# Patient Record
Sex: FEMALE | Race: WHITE | Employment: FULL TIME | URBAN - METROPOLITAN AREA
[De-identification: names, ages, dates, MRNs, and addresses within clinical notes are randomized per-mention and may not be internally consistent; named-entity substitution may affect disease eponyms.]

---

## 2016-08-11 LAB
ANTIBODY SCREEN, EXTERNAL: NORMAL
CHLAMYDIA, EXTERNAL: NORMAL
CYSTIC FIBROSIS, EXTERNAL: NORMAL
HBSAG, EXTERNAL: NORMAL
HEPATITIS C AB,   EXT: NORMAL
HIV, EXTERNAL: NORMAL
N. GONORRHEA, EXTERNAL: NORMAL
RUBELLA, EXTERNAL: NORMAL
TYPE, ABO & RH, EXTERNAL: NORMAL

## 2017-02-20 LAB — GRBS, EXTERNAL: NORMAL

## 2017-03-12 ENCOUNTER — HOSPITAL ENCOUNTER (INPATIENT)
Age: 37
LOS: 3 days | Discharge: HOME | End: 2017-03-15
Attending: OBSTETRICS & GYNECOLOGY | Admitting: OBSTETRICS & GYNECOLOGY
Payer: OTHER GOVERNMENT

## 2017-03-12 PROBLEM — B00.9 HERPES: Status: ACTIVE | Noted: 2017-03-12

## 2017-03-12 PROBLEM — O36.5990 SMALL FOR GESTATIONAL AGE FETUS AFFECTING MOTHER, ANTEPARTUM: Status: ACTIVE | Noted: 2017-03-12

## 2017-03-12 LAB
ABO + RH BLD: NORMAL
BASOPHILS # BLD AUTO: 0 K/UL (ref 0–0.06)
BASOPHILS # BLD: 0 % (ref 0–2)
BLOOD GROUP ANTIBODIES SERPL: NORMAL
DIFFERENTIAL METHOD BLD: ABNORMAL
EOSINOPHIL # BLD: 0 K/UL (ref 0–0.4)
EOSINOPHIL NFR BLD: 0 % (ref 0–5)
ERYTHROCYTE [DISTWIDTH] IN BLOOD BY AUTOMATED COUNT: 13.5 % (ref 11.6–14.5)
HCT VFR BLD AUTO: 38.4 % (ref 35–45)
HGB BLD-MCNC: 12.8 G/DL (ref 12–16)
LYMPHOCYTES # BLD AUTO: 20 % (ref 21–52)
LYMPHOCYTES # BLD: 2.2 K/UL (ref 0.9–3.6)
MCH RBC QN AUTO: 31.1 PG (ref 24–34)
MCHC RBC AUTO-ENTMCNC: 33.3 G/DL (ref 31–37)
MCV RBC AUTO: 93.2 FL (ref 74–97)
MONOCYTES # BLD: 1 K/UL (ref 0.05–1.2)
MONOCYTES NFR BLD AUTO: 9 % (ref 3–10)
NEUTS SEG # BLD: 7.6 K/UL (ref 1.8–8)
NEUTS SEG NFR BLD AUTO: 71 % (ref 40–73)
PLATELET # BLD AUTO: 210 K/UL (ref 135–420)
PMV BLD AUTO: 11 FL (ref 9.2–11.8)
RBC # BLD AUTO: 4.12 M/UL (ref 4.2–5.3)
SPECIMEN EXP DATE BLD: NORMAL
WBC # BLD AUTO: 10.8 K/UL (ref 4.6–13.2)

## 2017-03-12 PROCEDURE — 74011250637 HC RX REV CODE- 250/637: Performed by: ADVANCED PRACTICE MIDWIFE

## 2017-03-12 PROCEDURE — 65270000029 HC RM PRIVATE

## 2017-03-12 PROCEDURE — 74011250637 HC RX REV CODE- 250/637: Performed by: OBSTETRICS & GYNECOLOGY

## 2017-03-12 PROCEDURE — 86900 BLOOD TYPING SEROLOGIC ABO: CPT | Performed by: ADVANCED PRACTICE MIDWIFE

## 2017-03-12 PROCEDURE — 77030020255 HC SOL INJ LR 1000ML BG

## 2017-03-12 PROCEDURE — 85025 COMPLETE CBC W/AUTO DIFF WBC: CPT | Performed by: ADVANCED PRACTICE MIDWIFE

## 2017-03-12 RX ORDER — BUTORPHANOL TARTRATE 1 MG/ML
1-2 INJECTION INTRAMUSCULAR; INTRAVENOUS
Status: DISCONTINUED | OUTPATIENT
Start: 2017-03-12 | End: 2017-03-14 | Stop reason: HOSPADM

## 2017-03-12 RX ORDER — METHYLERGONOVINE MALEATE 0.2 MG/ML
0.2 INJECTION INTRAVENOUS AS NEEDED
Status: DISCONTINUED | OUTPATIENT
Start: 2017-03-12 | End: 2017-03-14 | Stop reason: HOSPADM

## 2017-03-12 RX ORDER — ONDANSETRON 2 MG/ML
4 INJECTION INTRAMUSCULAR; INTRAVENOUS
Status: DISCONTINUED | OUTPATIENT
Start: 2017-03-12 | End: 2017-03-14 | Stop reason: HOSPADM

## 2017-03-12 RX ORDER — LIDOCAINE HYDROCHLORIDE 10 MG/ML
30 INJECTION, SOLUTION EPIDURAL; INFILTRATION; INTRACAUDAL; PERINEURAL AS NEEDED
Status: DISCONTINUED | OUTPATIENT
Start: 2017-03-12 | End: 2017-03-14 | Stop reason: HOSPADM

## 2017-03-12 RX ORDER — VALACYCLOVIR HYDROCHLORIDE 500 MG/1
500 TABLET, FILM COATED ORAL DAILY
COMMUNITY
End: 2017-03-15

## 2017-03-12 RX ORDER — ACETAMINOPHEN 650 MG/1
650 SUPPOSITORY RECTAL
Status: DISCONTINUED | OUTPATIENT
Start: 2017-03-12 | End: 2017-03-14 | Stop reason: HOSPADM

## 2017-03-12 RX ORDER — ZOLPIDEM TARTRATE 5 MG/1
5 TABLET ORAL
Status: DISCONTINUED | OUTPATIENT
Start: 2017-03-12 | End: 2017-03-12

## 2017-03-12 RX ORDER — MAG HYDROX/ALUMINUM HYD/SIMETH 200-200-20
15 SUSPENSION, ORAL (FINAL DOSE FORM) ORAL
Status: DISCONTINUED | OUTPATIENT
Start: 2017-03-12 | End: 2017-03-14 | Stop reason: HOSPADM

## 2017-03-12 RX ORDER — SODIUM CHLORIDE, SODIUM LACTATE, POTASSIUM CHLORIDE, CALCIUM CHLORIDE 600; 310; 30; 20 MG/100ML; MG/100ML; MG/100ML; MG/100ML
125 INJECTION, SOLUTION INTRAVENOUS CONTINUOUS
Status: DISCONTINUED | OUTPATIENT
Start: 2017-03-12 | End: 2017-03-14 | Stop reason: HOSPADM

## 2017-03-12 RX ORDER — OXYTOCIN/RINGER'S LACTATE 20/1000 ML
500 PLASTIC BAG, INJECTION (ML) INTRAVENOUS ONCE
Status: ACTIVE | OUTPATIENT
Start: 2017-03-12 | End: 2017-03-13

## 2017-03-12 RX ORDER — HYDROMORPHONE HYDROCHLORIDE 1 MG/ML
1 INJECTION, SOLUTION INTRAMUSCULAR; INTRAVENOUS; SUBCUTANEOUS
Status: DISCONTINUED | OUTPATIENT
Start: 2017-03-12 | End: 2017-03-14 | Stop reason: HOSPADM

## 2017-03-12 RX ORDER — MISOPROSTOL 200 UG/1
800 TABLET ORAL
Status: DISCONTINUED | OUTPATIENT
Start: 2017-03-12 | End: 2017-03-14 | Stop reason: HOSPADM

## 2017-03-12 RX ORDER — OXYTOCIN IN 5 % DEXTROSE 30/500 ML
.5-2 PLASTIC BAG, INJECTION (ML) INTRAVENOUS
Status: DISCONTINUED | OUTPATIENT
Start: 2017-03-13 | End: 2017-03-13

## 2017-03-12 RX ORDER — CASTOR OIL 100 %
60 OIL (ML) ORAL
Status: COMPLETED | OUTPATIENT
Start: 2017-03-12 | End: 2017-03-14

## 2017-03-12 RX ORDER — TRISODIUM CITRATE DIHYDRATE AND CITRIC ACID MONOHYDRATE 500; 334 MG/5ML; MG/5ML
30 SOLUTION ORAL AS NEEDED
Status: DISCONTINUED | OUTPATIENT
Start: 2017-03-12 | End: 2017-03-14 | Stop reason: HOSPADM

## 2017-03-12 RX ORDER — OXYTOCIN/RINGER'S LACTATE 20/1000 ML
125 PLASTIC BAG, INJECTION (ML) INTRAVENOUS CONTINUOUS
Status: DISCONTINUED | OUTPATIENT
Start: 2017-03-12 | End: 2017-03-14 | Stop reason: HOSPADM

## 2017-03-12 RX ORDER — CARBOPROST TROMETHAMINE 250 UG/ML
250 INJECTION, SOLUTION INTRAMUSCULAR
Status: DISCONTINUED | OUTPATIENT
Start: 2017-03-12 | End: 2017-03-14 | Stop reason: HOSPADM

## 2017-03-12 RX ORDER — TERBUTALINE SULFATE 1 MG/ML
0.25 INJECTION SUBCUTANEOUS
Status: DISCONTINUED | OUTPATIENT
Start: 2017-03-12 | End: 2017-03-14 | Stop reason: HOSPADM

## 2017-03-12 RX ORDER — NALBUPHINE HYDROCHLORIDE 10 MG/ML
10 INJECTION, SOLUTION INTRAMUSCULAR; INTRAVENOUS; SUBCUTANEOUS
Status: DISCONTINUED | OUTPATIENT
Start: 2017-03-12 | End: 2017-03-14 | Stop reason: HOSPADM

## 2017-03-12 RX ORDER — ZOLPIDEM TARTRATE 5 MG/1
5 TABLET ORAL
Status: DISCONTINUED | OUTPATIENT
Start: 2017-03-12 | End: 2017-03-13

## 2017-03-12 RX ADMIN — ZOLPIDEM TARTRATE 5 MG: 5 TABLET, COATED ORAL at 21:00

## 2017-03-12 RX ADMIN — DINOPROSTONE 10 MG: 10 INSERT VAGINAL at 18:27

## 2017-03-12 NOTE — IP AVS SNAPSHOT
Senia eHrnandez 
 
 
 [address] [place] [address] Patient: Jennifer Aranda MRN: [id number] PVP:6/99/2864 You are allergic to the following Allergen Reactions Zosyn (Piperacillin-Tazobactam) Swelling Recent Documentation Height Weight Breastfeeding? BMI OB Status Smoking Status 1.6 m 69.4 kg Yes 27.1 kg/m2 Recent pregnancy Never Smoker Emergency Contacts Name Discharge Info Relation Home Work Mobile Tarik Culp DISCHARGE CAREGIVER [3] Spouse [3]   [phone] Srinath Mckeon  Friend [5] [phone] About your hospitalization You were admitted on:  March 12, 2017 You last received care in the:  [place] [address] You were discharged on:  March 15, 2017 Unit phone number:  [phone] Why you were hospitalized Your primary diagnosis was:  Small For Gestational Age Fetus Affecting Mother, Antepartum Your diagnoses also included:  Herpes, Labor And Delivery Indication For Care Or Intervention Providers Seen During Your Hospitalizations Provider Role Specialty Primary office phone Calvin Gonzalez MD Attending Provider Obstetrics & Gynecology [phone] Your Primary Care Physician (PCP) Primary Care Physician Office Phone Office Fax NONE ** None ** ** None ** Follow-up Information Follow up With Details Comments Contact Info None   None (395) Patient stated that they have no  [address], [address] [place] [address] In 6 weeks postpartum check [facility], [address] [place] [address]) [phone] Current Discharge Medication List  
  
START taking these medications Dose & Instructions Dispensing Information Comments Morning Noon Evening Bedtime  
 ibuprofen 800 mg tablet Commonly known as:  MOTRIN Your last dose was:     
   
Your next dose is:    
   
   
 Dose:  800 mg  
 Take 1 Tab by mouth every eight (8) hours as needed. Quantity:  60 Tab Refills:  2 CONTINUE these medications which have NOT CHANGED Dose & Instructions Dispensing Information Comments Morning Noon Evening Bedtime PRENATAL PO Your last dose was: Your next dose is:    
   
   
 Dose:  1 Tab Take 1 Tab by mouth daily. Refills:  0 STOP taking these medications VALTREX 500 mg tablet Generic drug:  valACYclovir Where to Get Your Medications Information on where to get these meds will be given to you by the nurse or doctor. ! Ask your nurse or doctor about these medications  
  ibuprofen 800 mg tablet Discharge Instructions CONGRATULATIONS ON THE BIRTH OF YOUR BABY! The first six weeks after childbirth is a time of physical and emotional adjustment. This handout will help to answer questions and provide guidance during the postpartum period. Every family's adjustment is unique, so please call if you have further concerns. At anytime we can be reached at [phone]. During office hours please ask to speak to a charge nurse. After hours, the answering service will take a message and the Nurse-Midwife on-call will return your call. If your question can wait until office hours: Monday-Friday 8:30-4:00, please do so. For emergencies or urgent concerns do not hesitate to call us after hours. DIET Your body is in need of a well-balanced, high protein diet to recuperate from birth. Please continue to take your prenatal vitamins for 6 weeks or as long as you are breastfeeding. Continue to drink at least 6-8 cups of water or other liquid a day. A breastfeeding mother also needs extra protein, calories and calcium containing foods. It is a good rule to drink fluids with every feeding in order to maintain an adequate milk supply and avoid dehydration.   Your baby will probably not be bothered by things in your diet, but if the baby seems extremely fussy or develops a rash, you may want to discuss possible food intolerances with your baby's care provider. PAIN MEDICATIONS Acetaminophen (Tylenol), ibuprofen (Motrin), or other prescribed pain medication may be taken as directed to relieve discomfort. The above medications pass in very minimal amounts into the breast milk and usually will not cause problems. There are medications that may affect the baby, so please consult your baby's care provider before taking medication. If you are breastfeeding, be sure to mention this to any care provider you see so that medications that are safe may be selected. There is an excellent resource called thredUP that is a resource for medication safety in pregnancy and lactation. You can visit their website at Dacentec/ or call them toll free at [phone] if you have any questions about medication safety. UTERINE INVOLUTION / VAGINAL BLEEDING Involution is the process of the uterus returning to pre-pregnant size. It will take approximately six weeks for this process to occur. To achieve this size your uterus becomes firm to slow bleeding loss from the placental site. The first 7 days after birth, the bleeding is red and heavy. It may change with your activity and position. Some small clots are normal.   After ten days, the bleeding should be pale pink and slowed considerably. The next several weeks may progress to a pink, mucousy discharge. This may continue for 6-8 weeks, depending on your activity. During the first four weeks after delivery we recommend using sanitary pads instead of tampons. Douching should also be avoided, but it is fine to take a tub bath so long as the tub is very clean. ACTIVITY/EXERCISE Adequate rest is essential to recovery.   Try to rest or sleep when the baby sleeps. After two weeks, you may begin going for short walks, doing Kegel exercises and abdominal crunches. Avoid heavy, jarring or aerobic exercises. Remember to start out slowly and build up to your previous fitness level. Use common sense and don't overdo as rest is important and the benefits of increased rest are a quicker recovery. For the first two weeks after a  try to limit trips up or down steps. Do not lift anything heavier than the baby during this time. Lifting the baby or other objects should be done by bending at the knees rather than the waist.  Driving should be avoided during the first two to three weeks until you have the strength to push firmly on the brakes in case of an emergency. You may ride as a passenger, but DO wear a seat belt at all times. After a few weeks, you may resume normal activity at whatever pace is comfortable for you. Exercise may also be resumed gradually. Walking is a good way to start. Finally, try to be reasonable in your expectations. Caring for a new baby after major surgery can be quite trying. Arrange for assistance at home to ensure that you get enough rest.  
 
POSTPARTUM CHECK You may call the office when you return home to set up a postpartum visit. Most patients will be seen at 6 weeks after delivery, but after a  or other circumstances you may be seen in 2 weeks or less. If you are discharged from the hospital with staples that must be removed, you will be asked to come in sooner. At your postpartum visit, a pelvic exam may be performed. If you are having any problems or concerns, please do not hesitate to call. Once again our number is [phone]. MOOD CHANGES Significant hormonal changes occur in the days following delivery, and as a result, many women experience brief episodes of tearfulness or feeling \"blue. \"  These emotional swings may be made worse by lack of sleep and by the adjustments inherent in becoming a mother. For some women, these fluctuations are minor. For others, they are overwhelming; creating feelings of anxiety, depression, or the inability to cope. If you have difficulty functioning as a result of feeling down, or if the mood changes seem severe, do not improve, or result is thoughts of harming yourself or others CALL RIGHT AWAY. PERINEAL CARE The basic goals of perineal care are to prevent infection, to relieve pain and promote healing. Your stitches will dissolve in four to six weeks, and do not need to be removed. After urinating, please continue to clean with warm water from front to back. Please continue sitz baths as instructed twice a day for a week or as needed. Call the office if you see pus in the suture site, or have unusual or severe swelling or pain that seems to be getting worse. INCISION CARE If you had a , clean and dry the incision gently as you would the rest of your body. Washing over the area with soap and water, and showering are fine. If steri-strips are present they will gradually come off with time. Tub baths are permitted. You may experience numbness and burning in the area surrounding the incision which usually resolves gradually over the next several weeks or months. RETURN OF MENSTRUATION Your first menstrual period may occur as soon as four to six weeks after your delivery if you are not breast-feeding. If breast-feeding it is more difficult to predict when your first period will occur. Even if you are not yet menstruating, you may be ovulating and it may be possible to conceive again. It is common for your first period after childbirth to be very heavy with an increased amount of cramping. BREASTS Breast-feeding Mothers: Colostrum is excreted in the first 24-72 hours. Mature breast milk will appear on the 2nd to 5th day.   Engorgement may occur with the mature milk making your breasts feel warm and very full. Frequent feedings will make you more comfortable. Babies do not nurse on regular schedules. Nursing every 1 1/2 to 2 hours is normal and frequent feeding DOES NOT mean you are not making enough milk. To avoid nipple confusion, do not give bottles for the first 4 weeks. Growth spurts are common and may require more frequent feedings. This is the way baby increases your milk supply. During a growth spurt, you may feel you are feeding very frequently and that your breasts are \"empty. \"  Don't worry, your milk is produced by supply and demand so this increased frequency of feeding will increase your milk supply within 48 hours. Sore nipples may occur with frequent feedings and are sometimes also caused by improper latch. Check for a proper latch. Baby should have a wide open mouth. Use different positions at each feeding if possible. Express a small amount of colostrum or breast milk onto the sore area and leave bra flaps unlatched until dry. The lactation consultant at [facility] is available for outpatient consultation without charge. Call [phone] from Monday-Friday 9:00am- 3:00pm to arrange an outpatient appointment with her. Local [facility] and consultants may also be very helpful. If You Are Not Breast-feeding: You will experience swelling, engorgement and some milk production. There are no safe medications available to stop lactation. Some remedies for engorgement include: wearing a tight bra, ice packs and cold green cabbage leaves placed between the breast and your bra. Change these frequently. Tylenol or Motrin should help with the discomfort. SEXUAL ADJUSTMENTS We recommend that you wait at least four weeks before resuming sexual intercourse. A sore perineum, a demanding baby and fatigue will certainly affect your ability to enjoy lovemaking!   A vaginal lubricant is recommended to help with any dryness. It is very important to remember that you will ovulate BEFORE your first period and can conceive. If you do not wish another pregnancy right away, please take precautions to avoid pregnancy. If you would like a prescription method of birth control, please discuss this with us at your 6 week visit. ELIMINATION We remind all postpartum patients that it may take a few days for your bowels to return to normal, especially if you had a long labor. For those who had C-sections or severe lacerations, we recommend that you use a stool softener twice daily for at least two weeks. Many stool softeners are over-the-counter. Colace (Docusate Sodium) is recommended. Bulk forming agents such as Metamucil or Fibercon may be used daily in addition to a stool softener to promote regular bowel movements. Eating fresh fruits and vegetables along with whole grains is helpful as well. Do not be afraid to have a bowel movement as your stitches will not \"come out\" in the course of having a bowel movement. Urination may be difficult due to soreness around the urethra, or as an after effect of epidural.  This is temporary and can be helped  by squirting water over the perineum or try going in the shower. Hemorrhoids are common after birth. Tucks pads, Anusol cream and avoiding constipation are helpful. If constipation does occur, you may take Milk of Magnesia or Senekot according to the package instructions. DANGER SIGNS! CALL WITHOUT DELAY IF YOU ARE EXPERIENCING ANY OF THE FOLLOWING: 
* Unusually heavy bleeding, soaking more than 1 or more pads in an hour. * Vaginal discharge with strong foul odor. * Fever of 101 or higher * Unusual pain or tenderness in the abdominal area. * If breasts are red, hot or have a painful lump. * Depression that persists longer than 1-2 weeks or is severe. * Any urinary frequency accompanied by urgency or pain. * A lump in leg or calf especially if painful, warm or red. We thank you for choosing us for your prenatal care and/or delivery. We wish you all happiness and health with your baby for his or her lifetime! Julia Cade CNM Discharge Instructions Attachments/References DEPRESSION: POSTPARTUM (ENGLISH) BABY-FRIENDLY BREASTFEEDING: GENERAL INFO (ENGLISH) Discharge Orders None [employer] Announcement We are excited to announce that we are making your provider's discharge notes available to you in FookyZ. You will see these notes when they are completed and signed by the physician that discharged you from your recent hospital stay. If you have any questions or concerns about any information you see in FookyZ, please call the Health Information Department where you were seen or reach out to your Primary Care Provider for more information about your plan of care. Introducing [facility]! Myles Dominguez introduces FookyZ patient portal. Now you can access parts of your medical record, email your doctor's office, and request medication refills online. 1. In your internet browser, go to [email or web address] 2. Click on the First Time User? Click Here link in the Sign In box. You will see the New Member Sign Up page. 3. Enter your FookyZ Access Code exactly as it appears below. You will not need to use this code after youve completed the sign-up process. If you do not sign up before the expiration date, you must request a new code. · FookyZ Access Code: [email or web address] Expires: 5/11/2017  5:02 PM 
 
4. Enter the last four digits of your Social Security Number (xxxx) and Date of Birth (mm/dd/yyyy) as indicated and click Submit. You will be taken to the next sign-up page. 5. Create a FookyZ ID. This will be your FookyZ login ID and cannot be changed, so think of one that is secure and easy to remember. 6. Create a [email or web address] password. You can change your password at any time. 7. Enter your Password Reset Question and Answer. This can be used at a later time if you forget your password. 8. Enter your e-mail address. You will receive e-mail notification when new information is available in [address] 9. Click Sign Up. You can now view and download portions of your medical record. 10. Click the Download Summary menu link to download a portable copy of your medical information. If you have questions, please visit the Frequently Asked Questions section of the [email or web address] website. Remember, [email or web address] is NOT to be used for urgent needs. For medical emergencies, dial 911. Now available from your iPhone and Android! General Information Please provide this summary of care documentation to your next provider. Patient Signature:  ____________________________________________________________ Date:  ____________________________________________________________  
  
Teresa Martins Provider Signature:  ____________________________________________________________ Date:  ____________________________________________________________ More Information Depression After Childbirth: Care Instructions Your Care Instructions Many women get the \"baby blues\" during the first few days after childbirth. You may lose sleep, feel irritable, and cry easily. You may feel happy one minute and sad the next. Hormone changes are one cause of these emotional changes. Also, the demands of a new baby, along with visits from relatives or other family needs, add to a mother's stress. The \"baby blues\" often peak around the fourth day. Then they ease up in less than 2 weeks. If your moodiness or anxiety lasts for more than 2 weeks, or if you feel like life is not worth living, you may have postpartum depression. This is different for each mother.  Some mothers with serious depression may worry intensely about their infant's well-being. Others may feel distant from their child. Some mothers might even feel that they might harm their baby. A mother may have signs of paranoia, wondering if someone is watching her. Depression is not a sign of weakness. It is a medical condition that requires treatment. Medicine and counseling often work well to reduce depression. Talk to your doctor about taking antidepressant medicine while breastfeeding. Follow-up care is a key part of your treatment and safety. Be sure to make and go to all appointments, and call your doctor if you are having problems. It's also a good idea to know your test results and keep a list of the medicines you take. How do you know if you are depressed? With all the changes in your life, you may not know if you are depressed. Pregnancy sometimes causes changes in how you feel that are similar to the symptoms of depression. Symptoms of depression include: · Feeling sad or hopeless and losing interest in daily activities. These are the most common symptoms of depression. · Sleeping too much or not enough. · Feeling tired. You may feel as if you have no energy. · Eating too much or too little. · Writing or talking about death, such as writing suicide notes or talking about guns, knives, or pills. Keep the numbers for these national suicide hotlines: 8-779-142-TALK (1-317.140.7405) and 2-582-ILDGZMV (5-776.440.3465). If you or someone you know talks about suicide or feeling hopeless, get help right away. How can you care for yourself at home? · Be safe with medicines. Take your medicines exactly as prescribed. Call your doctor if you think you are having a problem with your medicine. · Eat a healthy diet so that you can keep up your energy. · Get regular daily exercise, such as walks, to help improve your mood. · Get as much sunlight as possible. Keep your shades and curtains open. Get outside as much as you can. · Avoid using alcohol or other substances to feel better. · Get as much rest and sleep as possible. Avoid doing too much. Being too tired can increase depression. · Play stimulating music throughout your day and soothing music at night. · Schedule outings and visits with friends and family. Ask them to call you regularly, so that you do not feel alone. · Ask for help with preparing food and other daily tasks. Family and friends are often happy to help a mother with a . · Be honest with yourself and those who care about you. Tell them about your struggle. · Join a support group of new mothers. No one can better understand the challenges of caring for a  than other new mothers. · If you feel like life is not worth living or are feeling hopeless, get help right away. Keep the numbers for these national suicide hotlines: 2-727-797-TALK (2-525.960.6341) and 5-473-KSJMWAM (4-799.195.6596). When should you call for help? Call 911 anytime you think you may need emergency care. For example, call if: 
· You feel you cannot stop from hurting yourself, your baby, or someone else. Call your doctor now or seek immediate medical care if: 
· You are having trouble caring for yourself or your baby. · You hear voices. Watch closely for changes in your health, and be sure to contact your doctor if: 
· You have problems with your depression medicine. · You do not get better as expected. Where can you learn more? Go to http://odilia-.info/. Enter I148 in the search box to learn more about \"Depression After Childbirth: Care Instructions. \" Current as of: 2016 Content Version: 11.1 © 5539-0187 [employer]. Care instructions adapted under license by [employer] (which disclaims liability or warranty for this information).  If you have questions about a medical condition or this instruction, always ask your healthcare professional. [employer] disclaims any warranty or liability for your use of this information. Learning About Starting to Breastfeed Planning ahead Before your baby is born, plan ahead. Learn all you can about breastfeeding. This helps make breastfeeding easier. · Early in your pregnancy, talk to your doctor or midwife about breastfeeding. · Learn the basics before your baby is born. The staff at hospitals and birthing centers can help you find a lactation specialist. This person is often a nurse who has been trained to teach and advise women about breastfeeding. Or you can take a breastfeeding class. · Plan ahead for times when you will need help after your baby is born. Many women get help from friends and family. Some join a support group to talk to other moms who breastfeed. · Buy the equipment you'll need. Examples are breast pads, nipple cream, extra pillows, and nursing bras. Find out about breast pumps too. Getting help from your hospital or birthing center It's important to have support from the doctors, nurses, and hospital staff who care for you and your baby. Before it's time for you to give birth, ask about the breastfeeding policies at your hospital or birthing center. Look for a hospital or birthing center that has policies for: · \"Rooming in. \" This policy encourages you to have your baby in the room with you. It can allow you to breastfeed more often. · Supplemental feedings. Tell the staff that your baby is to get only your breast milk from birth. If staff feed your baby water, sugar solution, or formula right after birth without a medical reason, it may make it harder for you to breastfeed. · Pacifiers or artificial nipples. Staff should not give your  these items without your permission. They may interfere with breastfeeding. · Follow-up. Find out if your hospital can help you with breastfeeding issues after you go home.  See if you can get information on support groups or other contacts. They might help if you need help setting up and staying with your breastfeeding routine. Your first feeding It's best to start breastfeeding within 1 hour of birth. For each feeding, you go through these basic steps: · Get ready for the feeding. Be calm and relaxed, and try not to be distracted. Get some water or juice for yourself. Use two or three pillows to help support your baby while he or she is nursing. · Find a breastfeeding position that is comfortable for you and your baby. Examples are the cradle and the football positions. Make sure the baby's head and chest are lined up straight and facing your breast. It's best to switch which breast you start with each time. · Get the baby latched on well. Your baby's mouth needs to be wide open, like a yawn, so you may need to gently touch the middle of your baby's lower lip. When your baby's mouth is open wide, quickly bring the baby onto your nipple and areola. The areola is the dark [place] around your nipple. · Provide a complete feeding. Let your baby nurse for at least 15 minutes. Be sure to burp your baby after each breast. 
In the first days after birth, your breasts make a thick, yellow liquid called colostrum. This liquid gives your baby nutrients and antibodies against infection. It is all that babies need at first. Your breasts will fill with milk a few days after the birth. Talk to your doctor, midwife, or lactation specialist right away if you are having problems and aren't sure what to do. How often to breastfeed Plan to breastfeed your baby on demand rather than setting a strict schedule. For the first few days, be prepared to breastfeed every 1 to 3 hours. That often works out to about 8 to 12 times in a 24-hour period. Wake a sleeping baby to feed, if you need to. If you breastfeed more often, it will help your breasts to produce more milk. After you go home After you're home, don't be afraid to call your doctor, midwife, or lactation specialist with questions. That's true even if you don't know what's bothering you. They are used to parents of newborns calling. They can help you figure out if there is a problem, and if so, how to fix it. Plan for times when you will be apart from your baby. Use a breast pump to collect breast milk ahead of time. You can store milk in the refrigerator or freezer. Then it's ready when someone else will be taking care of your baby. Breastfeeding is a learned skill that gets easier over time. You are more likely to succeed if you plan ahead, learn the basic techniques, and know where to get help and support. Where can you learn more? Go to http://odilia-.info/. Enter A358 in the search box to learn more about \"Learning About Starting to Breastfeed. \" Current as of: May 30, 2016 Content Version: 11.1 © 2679-5576 [employer]. Care instructions adapted under license by [employer] (which disclaims liability or warranty for this information). If you have questions about a medical condition or this instruction, always ask your healthcare professional. [address] any warranty or liability for your use of this information.

## 2017-03-12 NOTE — PROGRESS NOTES
~~ 1710 REC  @ 39 3/7 WKS HERE FOR SCHEDULED IND FOR IUGR- PT DENIES REG CTX, SROM OR VAG BLEEDING. +FM PER PT. PT INTO GOWN THEN INTO BED, SITTING FOR COMF-- EFM APPLIED, ABD PALP SOFT. FHT 150S, FM AUDIBLE. SR UP X2, CB IN REACH, FOB IN-- PT ASKING ABOUT IND PROCESS-- REVIEWED CERVIDIL TONIGHT & PITOCIN IN THE AM DEPENDING ON CX-  PT SEEMS A BIT ANXIOUS. BRIEF HX TAKEN, SEE ADMISSION FLOW SHEET & DATA BASES- HX INCLUDES ALLERGY TO ZOSTYN, ?HSV (ON VALTREX), PREV TERM VAG DEL, HX COLON RESECTION. BABY IUGR. PT DENIES ANY OTHER ISSUES W/ HEALTH OR PREG-  POC REVIEWED    ~~1730 CONSENTS REVIEWED & LEFT FOR PT TO SIGN- TOLD TO CALL W/ ?S    ~~1745 IV STARTED IN L ARM W/ #18 ANGIO, ADMISSION BLOOD WORK DRAWN, 1000CC LR UP FOR SM BOLUS AS PT SAYS SHE HAS NOT BEEN DRINKING MUCH WATER TODAY--  PT PATRICIA  WELL-     ~~1755 PT UP TO BR TO VOID QS W/O DIFF-- FOB ASSISTING    ~~ 1758 BACK TO BED, SITTING FOR COMF    ~~ 1826 B CASTRO CNM IN TO SEE & ASSESS PT--  VE DONE & CERVIDIL PLACED--  PT PATRICIA FAIR, CLOSING LEGS A BIT & MOVING UP THE BED-- NO BLOOD ON GLOVE-- CNM REVIEWING POC INCLUDING DINNER TONIGHT, AMBIEN FOR SLEEP, SHOWER IN THE AM & PIT START @ 0600--  CNM ANSWERING ALL ? S    ~~1849 PT CHANGING INTO OWN LABOR GOWN- SEEMS TIGHT. EFM TRACING MAT HR BRIEFLY-- EFM REPOS & FHT WNL. PT SITTING IN BED FOR COMF-- NO NEEDS VOICED.  FOB TO GO GET FOOD

## 2017-03-12 NOTE — PROGRESS NOTES
Bedside and Verbal shift change report given to Courtney Luis RN   (oncoming nurse) by NAIF Parnell RN  (offgoing nurse). Report included the following information SBAR, MAR and Recent Results. Pt resting in bed. Waiting on spouse with food. Questions answered regarding process tonight. Call bell within in reach.

## 2017-03-12 NOTE — H&P
History & Physical    Name: Sree Ariza MRN: [id number]  SSN: [id number]    YOB: 1980  Age: 31 y.o. Sex: female        Subjective:     Estimated Date of Delivery: 3/16/17  OB History      Para Term  AB TAB SAB Ectopic Multiple Living    2 1 1       1          Ms. Kami Wade is admitted with pregnancy at 39w3d for induction of labor due to concerns about fetal growth. Fetus currently estimated to be in the 13th percentile at 6 lb 7 oz with with BPD/HC <5th% and AC <10th%. Her last baby was reported to be AGA at birth. Prenatal course was complicated by fetal growth restriction. . Prenatal care has been [facility] by [facility] from 8 weeks. Please see prenatal records for details. Past Medical History:   Diagnosis Date    Genital herpes      Past Surgical History:   Procedure Laterality Date    HX COLECTOMY       Social History     Occupational History    Not on file. Social History Main Topics    Smoking status: Never Smoker    Smokeless tobacco: Not on file    Alcohol use No    Drug use: No    Sexual activity: Yes     Partners: Male     Family History   Problem Relation Age of Onset    Diabetes Mother     Diabetes Father     Diabetes Sister        Allergies   Allergen Reactions    Zosyn [Piperacillin-Tazobactam] Swelling     Prior to Admission medications    Medication Sig Start Date End Date Taking? Authorizing Provider   VUL58/QWLH FUM/FOLIC ACID (PRENATAL PO) Take 1 Tab by mouth daily. Yes Historical Provider   valACYclovir (VALTREX) 500 mg tablet Take 500 mg by mouth daily. Yes Historical Provider        Review of Systems: Pertinent items are noted in HPI. Objective:     Vitals:  Vitals:    17 1715   Weight: 69.4 kg (153 lb)   Height: 5' 3\" (1.6 m)        Physical Exam:  Patient without distress.   Abdomen: soft, nontender  Fundus: soft and non tender  Perineum: blood absent, amniotic fluid absent  Cervical Exam:  Snug 1 cm dilated  Cervidil placed in posterior fornix. 50% effaced    -2 station    Presenting Part: cephalic  Cervical Position: mid position  Consistency: Medium  Lower Extremities:  - Edema No  Membranes:  Intact  Fetal Heart Rate: Reactive  Baseline: 130s per minute  Variability: moderate  Accelerations: yes  Decelerations: none  Uterine contractions:every 3-4 minutes and mild  Prenatal Labs:   A negative  Group B Strep was negative. Assessment/Plan:   Assessment: Fetal growth restriction  Reassuring FHTs  GBS neg    Plan: Admit for induction of labor. Dr Jordy Maradiaga aware of the admission. .      Signed By:  Janes Jeronimo CNM     March 12, 2017

## 2017-03-12 NOTE — IP AVS SNAPSHOT
Summary of Care Report The Summary of Care report has been created to help improve care coordination. Users with access to [employer] or [address] (Web-based application) may access additional patient information including the Discharge Summary. If you are not currently a [address] user and need more information, please call the number listed below in the [place] [address] section and ask to be connected with Medical Records. Facility Information Name Address Phone [address] [facility] [address] [facility] [address] [phone] Patient Information Patient Name Sex RUTH Lopez ([id number]) Female 1980 Discharge Information Admitting Provider Service Area Unit Manuel Mayo MD / Prem [address] 92 3 Mother Baby Unit / [phone] Discharge Provider Discharge Date/Time Discharge Disposition Destination (none) 3/15/2017 (Pending) AHR (none) Patient Language Language ENGLISH [13] Problem List as of 3/15/2017  Date Reviewed: 3/14/2017 Codes Priority Class Noted - Resolved * (Principal)Small for gestational age fetus affecting mother, antepartum ICD-10-CM: I97.3471 ICD-9-CM: 656.53   3/12/2017 - Present Herpes ICD-10-CM: B00.9 ICD-9-CM: 054.9   3/12/2017 - Present Overview Signed 3/12/2017  5:55 PM by Janes Jeronimo CNM No lesions Labor and delivery indication for care or intervention ICD-10-CM: O75.9 ICD-9-CM: 659.90   3/12/2017 - Present You are allergic to the following Allergen Reactions Zosyn (Piperacillin-Tazobactam) Swelling Current Discharge Medication List  
  
START taking these medications Dose & Instructions Dispensing Information Comments  
 ibuprofen 800 mg tablet Commonly known as:  MOTRIN  Dose:  800 mg  
 Take 1 Tab by mouth every eight (8) hours as needed. Quantity:  60 Tab Refills:  2 CONTINUE these medications which have NOT CHANGED Dose & Instructions Dispensing Information Comments PRENATAL PO Dose:  1 Tab Take 1 Tab by mouth daily. Refills:  0 STOP taking these medications Comments VALTREX 500 mg tablet Generic drug:  valACYclovir Current Immunizations Name Date DTaP 1/18/2017 Influenza Vaccine 10/7/2016, 12/10/2014 Rho(D) Immune Globulin - IM 12/27/2016 Surgery Information ID Date/Time Status Primary Surgeon All Procedures Location [id number] 3/14/2017 [address] [place] [address] - DO NOT SCHEDULE Follow-up Information Follow up With Details Comments Contact Info None   None ([phone]) Patient stated that they have no  [address] [place] [address] [place] In 6 weeks postpartum check [address] [place] [address], [address] [place] [address]) [phone] Discharge Instructions CONGRATULATIONS ON THE BIRTH OF YOUR BABY! The first six weeks after childbirth is a time of physical and emotional adjustment. This handout will help to answer questions and provide guidance during the postpartum period. Every family's adjustment is unique, so please call if you have further concerns. At anytime we can be reached at [phone]. During office hours please ask to speak to a charge nurse. After hours, the answering service will take a message and the Nurse-Midwife on-call will return your call. If your question can wait until office hours: Monday-Friday 8:30-4:00, please do so. For emergencies or urgent concerns do not hesitate to call us after hours. DIET Your body is in need of a well-balanced, high protein diet to recuperate from birth. Please continue to take your prenatal vitamins for 6 weeks or as long as you are breastfeeding.   Continue to drink at least 6-8 cups of water or other liquid a day. A breastfeeding mother also needs extra protein, calories and calcium containing foods. It is a good rule to drink fluids with every feeding in order to maintain an adequate milk supply and avoid dehydration. Your baby will probably not be bothered by things in your diet, but if the baby seems extremely fussy or develops a rash, you may want to discuss possible food intolerances with your baby's care provider. PAIN MEDICATIONS Acetaminophen (Tylenol), ibuprofen (Motrin), or other prescribed pain medication may be taken as directed to relieve discomfort. The above medications pass in very minimal amounts into the breast milk and usually will not cause problems. There are medications that may affect the baby, so please consult your baby's care provider before taking medication. If you are breastfeeding, be sure to mention this to any care provider you see so that medications that are safe may be selected. There is an excellent resource called [employer] that is a resource for medication safety in pregnancy and lactation. You can visit their website at iFollo/ or call them toll free at [phone] if you have any questions about medication safety. UTERINE INVOLUTION / VAGINAL BLEEDING Involution is the process of the uterus returning to pre-pregnant size. It will take approximately six weeks for this process to occur. To achieve this size your uterus becomes firm to slow bleeding loss from the placental site. The first 7 days after birth, the bleeding is red and heavy. It may change with your activity and position. Some small clots are normal.   After ten days, the bleeding should be pale pink and slowed considerably. The next several weeks may progress to a pink, mucousy discharge. This may continue for 6-8 weeks, depending on your activity.   During the first four weeks after delivery we recommend using sanitary pads instead of tampons. Douching should also be avoided, but it is fine to take a tub bath so long as the tub is very clean. ACTIVITY/EXERCISE Adequate rest is essential to recovery. Try to rest or sleep when the baby sleeps. After two weeks, you may begin going for short walks, doing Kegel exercises and abdominal crunches. Avoid heavy, jarring or aerobic exercises. Remember to start out slowly and build up to your previous fitness level. Use common sense and don't overdo as rest is important and the benefits of increased rest are a quicker recovery. For the first two weeks after a  try to limit trips up or down steps. Do not lift anything heavier than the baby during this time. Lifting the baby or other objects should be done by bending at the knees rather than the waist.  Driving should be avoided during the first two to three weeks until you have the strength to push firmly on the brakes in case of an emergency. You may ride as a passenger, but DO wear a seat belt at all times. After a few weeks, you may resume normal activity at whatever pace is comfortable for you. Exercise may also be resumed gradually. Walking is a good way to start. Finally, try to be reasonable in your expectations. Caring for a new baby after major surgery can be quite trying. Arrange for assistance at home to ensure that you get enough rest.  
 
POSTPARTUM CHECK You may call the office when you return home to set up a postpartum visit. Most patients will be seen at 6 weeks after delivery, but after a  or other circumstances you may be seen in 2 weeks or less. If you are discharged from the hospital with staples that must be removed, you will be asked to come in sooner. At your postpartum visit, a pelvic exam may be performed. If you are having any problems or concerns, please do not hesitate to call. Once again our number is [phone].  
 
MOOD CHANGES 
 Significant hormonal changes occur in the days following delivery, and as a result, many women experience brief episodes of tearfulness or feeling \"blue. \"  These emotional swings may be made worse by lack of sleep and by the adjustments inherent in becoming a mother. For some women, these fluctuations are minor. For others, they are overwhelming; creating feelings of anxiety, depression, or the inability to cope. If you have difficulty functioning as a result of feeling down, or if the mood changes seem severe, do not improve, or result is thoughts of harming yourself or others CALL RIGHT AWAY. PERINEAL CARE The basic goals of perineal care are to prevent infection, to relieve pain and promote healing. Your stitches will dissolve in four to six weeks, and do not need to be removed. After urinating, please continue to clean with warm water from front to back. Please continue sitz baths as instructed twice a day for a week or as needed. Call the office if you see pus in the suture site, or have unusual or severe swelling or pain that seems to be getting worse. INCISION CARE If you had a , clean and dry the incision gently as you would the rest of your body. Washing over the area with soap and water, and showering are fine. If steri-strips are present they will gradually come off with time. Tub baths are permitted. You may experience numbness and burning in the area surrounding the incision which usually resolves gradually over the next several weeks or months. RETURN OF MENSTRUATION Your first menstrual period may occur as soon as four to six weeks after your delivery if you are not breast-feeding. If breast-feeding it is more difficult to predict when your first period will occur. Even if you are not yet menstruating, you may be ovulating and it may be possible to conceive again.  It is common for your first period after childbirth to be very heavy with an increased amount of cramping. BREASTS Breast-feeding Mothers: Colostrum is excreted in the first 24-72 hours. Mature breast milk will appear on the 2nd to 5th day. Engorgement may occur with the mature milk making your breasts feel warm and very full. Frequent feedings will make you more comfortable. Babies do not nurse on regular schedules. Nursing every 1 1/2 to 2 hours is normal and frequent feeding DOES NOT mean you are not making enough milk. To avoid nipple confusion, do not give bottles for the first 4 weeks. Growth spurts are common and may require more frequent feedings. This is the way baby increases your milk supply. During a growth spurt, you may feel you are feeding very frequently and that your breasts are \"empty. \"  Don't worry, your milk is produced by supply and demand so this increased frequency of feeding will increase your milk supply within 48 hours. Sore nipples may occur with frequent feedings and are sometimes also caused by improper latch. Check for a proper latch. Baby should have a wide open mouth. Use different positions at each feeding if possible. Express a small amount of colostrum or breast milk onto the sore area and leave bra flaps unlatched until dry. The lactation consultant at [facility] is available for outpatient consultation without charge. Call [phone] from Monday-Friday 9:00am- 3:00pm to arrange an outpatient appointment with her. Local [facility] and consultants may also be very helpful. If You Are Not Breast-feeding: You will experience swelling, engorgement and some milk production. There are no safe medications available to stop lactation. Some remedies for engorgement include: wearing a tight bra, ice packs and cold green cabbage leaves placed between the breast and your bra. Change these frequently. Tylenol or Motrin should help with the discomfort.  
 
SEXUAL ADJUSTMENTS 
 We recommend that you wait at least four weeks before resuming sexual intercourse. A sore perineum, a demanding baby and fatigue will certainly affect your ability to enjoy lovemaking! A vaginal lubricant is recommended to help with any dryness. It is very important to remember that you will ovulate BEFORE your first period and can conceive. If you do not wish another pregnancy right away, please take precautions to avoid pregnancy. If you would like a prescription method of birth control, please discuss this with us at your 6 week visit. ELIMINATION We remind all postpartum patients that it may take a few days for your bowels to return to normal, especially if you had a long labor. For those who had C-sections or severe lacerations, we recommend that you use a stool softener twice daily for at least two weeks. Many stool softeners are over-the-counter. Colace (Docusate Sodium) is recommended. Bulk forming agents such as Metamucil or Fibercon may be used daily in addition to a stool softener to promote regular bowel movements. Eating fresh fruits and vegetables along with whole grains is helpful as well. Do not be afraid to have a bowel movement as your stitches will not \"come out\" in the course of having a bowel movement. Urination may be difficult due to soreness around the urethra, or as an after effect of epidural.  This is temporary and can be helped  by squirting water over the perineum or try going in the shower. Hemorrhoids are common after birth. Tucks pads, Anusol cream and avoiding constipation are helpful. If constipation does occur, you may take Milk of Magnesia or [place]ekot according to the package instructions. DANGER SIGNS! CALL WITHOUT DELAY IF YOU ARE EXPERIENCING ANY OF THE FOLLOWING: 
* Unusually heavy bleeding, soaking more than 1 or more pads in an hour. * Vaginal discharge with strong foul odor. * Fever of 101 or higher * Unusual pain or tenderness in the abdominal area. * If breasts are red, hot or have a painful lump. * Depression that persists longer than 1-2 weeks or is severe. * Any urinary frequency accompanied by urgency or pain. * A lump in leg or calf especially if painful, warm or red. We thank you for choosing us for your prenatal care and/or delivery. We wish you all happiness and health with your baby for his or her lifetime! Ester Ash CNM Chart Review Routing History No Routing History on File

## 2017-03-12 NOTE — IP AVS SNAPSHOT
Current Discharge Medication List  
  
START taking these medications Dose & Instructions Dispensing Information Comments Morning Noon Evening Bedtime  
 ibuprofen 800 mg tablet Commonly known as:  MOTRIN Your last dose was: Your next dose is:    
   
   
 Dose:  800 mg Take 1 Tab by mouth every eight (8) hours as needed. Quantity:  60 Tab Refills:  2 CONTINUE these medications which have NOT CHANGED Dose & Instructions Dispensing Information Comments Morning Noon Evening Bedtime PRENATAL PO Your last dose was: Your next dose is:    
   
   
 Dose:  1 Tab Take 1 Tab by mouth daily. Refills:  0 STOP taking these medications VALTREX 500 mg tablet Generic drug:  valACYclovir Where to Get Your Medications Information on where to get these meds will be given to you by the nurse or doctor. ! Ask your nurse or doctor about these medications  
  ibuprofen 800 mg tablet

## 2017-03-13 PROCEDURE — 74011250636 HC RX REV CODE- 250/636: Performed by: ADVANCED PRACTICE MIDWIFE

## 2017-03-13 PROCEDURE — 74011250637 HC RX REV CODE- 250/637: Performed by: ADVANCED PRACTICE MIDWIFE

## 2017-03-13 PROCEDURE — 65270000029 HC RM PRIVATE

## 2017-03-13 PROCEDURE — 59200 INSERT CERVICAL DILATOR: CPT

## 2017-03-13 PROCEDURE — 0U7G7ZZ DILATION OF VAGINA, VIA NATURAL OR ARTIFICIAL OPENING: ICD-10-PCS | Performed by: ADVANCED PRACTICE MIDWIFE

## 2017-03-13 PROCEDURE — 0U797ZZ DILATION OF UTERUS, VIA NATURAL OR ARTIFICIAL OPENING: ICD-10-PCS | Performed by: ADVANCED PRACTICE MIDWIFE

## 2017-03-13 RX ORDER — SIMETHICONE 80 MG
80 TABLET,CHEWABLE ORAL
Status: DISCONTINUED | OUTPATIENT
Start: 2017-03-13 | End: 2017-03-14

## 2017-03-13 RX ORDER — ZOLPIDEM TARTRATE 5 MG/1
5 TABLET ORAL
Status: DISCONTINUED | OUTPATIENT
Start: 2017-03-13 | End: 2017-03-14

## 2017-03-13 RX ORDER — OXYTOCIN IN 5 % DEXTROSE 30/500 ML
.5-2 PLASTIC BAG, INJECTION (ML) INTRAVENOUS
Status: DISCONTINUED | OUTPATIENT
Start: 2017-03-14 | End: 2017-03-14

## 2017-03-13 RX ORDER — POLYETHYLENE GLYCOL 3350 17 G/17G
17 POWDER, FOR SOLUTION ORAL
Status: DISCONTINUED | OUTPATIENT
Start: 2017-03-13 | End: 2017-03-14

## 2017-03-13 RX ADMIN — Medication 2 MILLI-UNITS/MIN: at 07:20

## 2017-03-13 RX ADMIN — NALBUPHINE HYDROCHLORIDE 10 MG: 10 INJECTION, SOLUTION INTRAMUSCULAR; INTRAVENOUS; SUBCUTANEOUS at 21:19

## 2017-03-13 RX ADMIN — SODIUM CHLORIDE, SODIUM LACTATE, POTASSIUM CHLORIDE, AND CALCIUM CHLORIDE 125 ML/HR: 600; 310; 30; 20 INJECTION, SOLUTION INTRAVENOUS at 07:19

## 2017-03-13 RX ADMIN — SODIUM CHLORIDE, SODIUM LACTATE, POTASSIUM CHLORIDE, AND CALCIUM CHLORIDE 125 ML/HR: 600; 310; 30; 20 INJECTION, SOLUTION INTRAVENOUS at 13:23

## 2017-03-13 RX ADMIN — POLYETHYLENE GLYCOL 3350 17 G: 17 POWDER, FOR SOLUTION ORAL at 19:58

## 2017-03-13 RX ADMIN — ZOLPIDEM TARTRATE 5 MG: 5 TABLET, COATED ORAL at 21:19

## 2017-03-13 RX ADMIN — SIMETHICONE CHEW TAB 80 MG 80 MG: 80 TABLET ORAL at 19:58

## 2017-03-13 NOTE — PROGRESS NOTES
Romain Kelly MD  [address] [facility]  [address]  [phone]                 Labor progress note:       Here to evaluate labor progress. Estimated Gestational Age: 38w3d       Historical Additional  Problem List.: Problem List as of 3/12/2017  Never Reviewed          Codes Class Noted - Resolved    * (Principal)Small for gestational age fetus affecting mother, antepartum ICD-10-CM: O36.5990  ICD-9-CM: 656.53  3/12/2017 - Present        Herpes ICD-10-CM: B00.9  ICD-9-CM: 054.9  3/12/2017 - Present    Overview Signed 3/12/2017  5:55 PM by Gretchen Rose CNM     No lesions             Labor and delivery indication for care or intervention ICD-10-CM: O75.9  ICD-9-CM: 659.90  3/12/2017 - Present               Baseline FHR: Patient Vitals for the past 2 hrs: Mode Fetal Heart Rate Fetal Activity Variability Decelerations Accelerations Non Stress Test   03/12/17 2100 External 125 Present 6-25 BPM None Yes -   03/12/17 2010 External 125 Present 6-25 BPM None Yes Reactive        Previous pelvic exam:CervicalExam:1/50 %/-2/Posterior      Cervical Exam  Dilation (cm): 1  Eff: 50 %  Station: -2  Position: Posterior  Cervical Consistency: Firm  Baby Position: Vertex  Membrane Status: Intact     Membranes  Membrane Status: Intact     Baseline FHR: Patient Vitals for the past 4 hrs:    Mode Fetal Heart Rate Fetal Activity Variability Decelerations Accelerations Non Stress Test   03/12/17 2100 External 125 Present 6-25 BPM None Yes -   03/12/17 2010 External 125 Present 6-25 BPM None Yes Reactive   03/12/17 1910 External 163 - - - - -   03/12/17 1835 External 130 - 6-25 BPM None Yes -   03/12/17 1805 External 130 - 6-25 BPM None Yes -        Visit Vitals    /79 (BP 1 Location: Left arm, BP Patient Position: At rest;Sitting)    Pulse 75    Temp 98.1 °F (36.7 °C)    Resp 18    Ht 5' 3\" (1.6 m)    Wt 69.4 kg (153 lb)    Breastfeeding Yes    BMI 27.1 kg/m2 Temp (24hrs), Av.1 °F (36.7 °C), Min:98.1 °F (36.7 °C), Max:98.1 °F (36.7 °C)      WBC   Date/Time Value Ref Range Status   2017 05:45 PM 10.8 4.6 - 13.2 K/uL Final     HGB   Date/Time Value Ref Range Status   2017 05:45 PM 12.8 12.0 - 16.0 g/dL Final     PLATELET   Date/Time Value Ref Range Status   2017 05:45  135 - 420 K/uL Final        Charline's progress was discussed,as well as her continued options. SGA induction, already having some contractions, said hello, not asleep yet        Plan: We have decided to continue with the plan.                   Orestes Herrera MD

## 2017-03-13 NOTE — PROGRESS NOTES
Bedside and Verbal shift change report given to CANDELARIA Nicole RN  (oncoming nurse) by Renay Alvarado RN   (offgoing nurse). Report included the following information DEVANTE, MAR and Recent Results.

## 2017-03-13 NOTE — PROGRESS NOTES
Labor Progress Note  Patient seen, fetal heart rate and contraction pattern evaluated. Just starting to feel ctxs. Patient Vitals for the past 1 hrs:   BP Pulse Resp   03/13/17 0835 - - 18   03/13/17 0830 103/62 80 -   03/13/17 0815 108/65 77 -   03/13/17 0800 106/67 81 -   03/13/17 0750 - - 18   03/13/17 0745 111/67 82 -       Physical Exam:  Cervical Exam:  1/50 %/-2/Posterior after Cervidil out  Membranes:  Intact  Uterine Activity: Frequency: Every 3-4 minutes and Duration: 60-80 seconds, Pitocin at 6mu/min  Fetal Heart Rate: Baseline: 145 per minute  Variability: moderate  Accelerations: yes  Decelerations: none    Assessment:  IUP 39.4 wks; IOL for SGA; FHT Cat 1  Plan:  Continue titration.  AROM when possible

## 2017-03-13 NOTE — ROUTINE PROCESS
Assumed care of patient, received report from ISAIAS Burr RN. Patient OOB to [facility], [place].

## 2017-03-13 NOTE — PROGRESS NOTES
Bedside and Verbal shift change report given to AAKASH Nance RN, (oncoming nurse) by CANDELARIA Burr RN, (offgoing nurse). Report included the following information SBAR, Kardex, Procedure Summary, Intake/Output, MAR, Recent Results and Med Rec Status. .    Receiving RN aware of induction measures and that pt has tolerated well so far. Receiving Rn aware that orders were just received for miralax and that the pt would like to receive this soon. Receiving RN denies questions or concerns at the time of transfer of care. Pt up to bathroom to get ready for bed at time of transfer of care. Pt denies needs, questions or concerns, at time of hand off.      Patricia Witt RN

## 2017-03-13 NOTE — PROCEDURES
After Debbie Underwood was informed of serial IOL. She elected to proceed with ripening again tonight. Reviewed placement of balloon, mechanics of agent and intended effect. SVE Cervical Exam  Dilation (cm): 1 (Per Tiffany Puente CNM)  Eff: 50 %  Station: -2  Position: Posterior  Cervical Consistency: Firm  Baby Position: Vertex  Membrane Status: Intact, vtx, intact membranes. Patient Vitals for the past 4 hrs: Mode Fetal Heart Rate Fetal Activity Variability Decelerations Accelerations RN Reviewed Strip?   03/13/17 1645 External 125 Present 6-25 BPM Early Yes -   03/13/17 1630 External 125 Present 6-25 BPM Early Yes -   03/13/17 1615 External 125 Present 6-25 BPM None Yes -   03/13/17 1600 External 130 Present 6-25 BPM None Yes -   03/13/17 1545 External 130 Present 6-25 BPM None Yes -   03/13/17 1530 External 130 Present 6-25 BPM None Yes -   03/13/17 1515 External 135 Present 6-25 BPM None Yes -   03/13/17 1500 External 135 Present 6-25 BPM None Yes -   03/13/17 1445 External 135 Present 6-25 BPM None Yes -   03/13/17 1430 External 130 Present 6-25 BPM None Yes -   03/13/17 1415 External 130 Present 6-25 BPM None Yes -   03/13/17 1400 External 130 Present 6-25 BPM None Yes -   03/13/17 1345 External 130 Present 6-25 BPM None Yes -   03/13/17 1330 External 135 Present 6-25 BPM None Yes -     Ctxs Frequency: Every 2-5 minutes, Duration: 50-60 seconds and Intensity: mild    Lighted speculum placed PV and cervix visualized. [employer] balloon threaded into cervix with stylet. 40cc NS placed in uterine balloon. Vaginal ballon visible. Speculum removed. Stylet difficult to remove. Uterine balloon released of 20cc NS. Stylet removed and balloon refilled. Position verified by cervical exam. 40cc placed in vaginal balloon. This was repeated for the uterine balloon and then vaginal balloon for total of 80/80 cc. Pt and fetus tolerated well. Torres Barlow MD given report.  Plan to remove balloon in 12 hours and start Pitocin at that time.

## 2017-03-13 NOTE — PROGRESS NOTES
Labor Progress Note  Patient seen, fetal heart rate and contraction pattern evaluated, patient examined. Not having any pain with ctxs  Patient Vitals for the past 1 hrs:   BP Temp Pulse Resp   03/13/17 1245 110/74 - 70 -   03/13/17 1230 110/69 - 69 -   03/13/17 1216 - 97.7 °F (36.5 °C) - 18   03/13/17 1215 118/78 - 78 -   03/13/17 1200 111/74 - 69 -       Physical Exam:  Cervical Exam: very posterior, no change  Membranes:  Intact  Uterine Activity: Frequency: Every 2-4 minutes and Duration: 60-70 seconds, Pitocin at 16 mu/min  Fetal Heart Rate: Baseline: 130 per minute  Variability: moderate  Accelerations: yes  Decelerations: none    Assessment:  IUP 38.4 wks; IOL for SGA; FHT Cat 1  Plan:  Discussed lack of change, plan for the day and the concept of serial induction.  Continue Pitocin and re-examine at

## 2017-03-14 ENCOUNTER — ANESTHESIA (OUTPATIENT)
Dept: LABOR AND DELIVERY | Age: 37
End: 2017-03-14
Payer: OTHER GOVERNMENT

## 2017-03-14 ENCOUNTER — ANESTHESIA EVENT (OUTPATIENT)
Dept: LABOR AND DELIVERY | Age: 37
End: 2017-03-14
Payer: OTHER GOVERNMENT

## 2017-03-14 PROCEDURE — 74011250637 HC RX REV CODE- 250/637: Performed by: ADVANCED PRACTICE MIDWIFE

## 2017-03-14 PROCEDURE — 65270000029 HC RM PRIVATE

## 2017-03-14 PROCEDURE — 75410000000 HC DELIVERY VAGINAL/SINGLE

## 2017-03-14 PROCEDURE — 75410000003 HC RECOV DEL/VAG/CSECN EA 0.5 HR

## 2017-03-14 PROCEDURE — 74011250636 HC RX REV CODE- 250/636: Performed by: ADVANCED PRACTICE MIDWIFE

## 2017-03-14 PROCEDURE — 3E0R3CZ INTRODUCE REGIONAL ANESTH IN SPINAL CANAL, PERC: ICD-10-PCS | Performed by: ANESTHESIOLOGY

## 2017-03-14 PROCEDURE — 76060000078 HC EPIDURAL ANESTHESIA

## 2017-03-14 PROCEDURE — 75410000002 HC LABOR FEE PER 1 HR

## 2017-03-14 PROCEDURE — 74011250636 HC RX REV CODE- 250/636: Performed by: ANESTHESIOLOGY

## 2017-03-14 PROCEDURE — 74011000250 HC RX REV CODE- 250: Performed by: ANESTHESIOLOGY

## 2017-03-14 RX ORDER — NALOXONE HYDROCHLORIDE 0.4 MG/ML
0.2 INJECTION, SOLUTION INTRAMUSCULAR; INTRAVENOUS; SUBCUTANEOUS AS NEEDED
Status: DISCONTINUED | OUTPATIENT
Start: 2017-03-14 | End: 2017-03-14

## 2017-03-14 RX ORDER — ZOLPIDEM TARTRATE 5 MG/1
5 TABLET ORAL
Status: DISCONTINUED | OUTPATIENT
Start: 2017-03-14 | End: 2017-03-15 | Stop reason: HOSPADM

## 2017-03-14 RX ORDER — ACETAMINOPHEN 325 MG/1
650 TABLET ORAL
Status: DISCONTINUED | OUTPATIENT
Start: 2017-03-14 | End: 2017-03-15 | Stop reason: HOSPADM

## 2017-03-14 RX ORDER — HYDROCORTISONE 25 MG/G
CREAM TOPICAL AS NEEDED
Status: DISCONTINUED | OUTPATIENT
Start: 2017-03-14 | End: 2017-03-15 | Stop reason: HOSPADM

## 2017-03-14 RX ORDER — NALBUPHINE HYDROCHLORIDE 10 MG/ML
2.5 INJECTION, SOLUTION INTRAMUSCULAR; INTRAVENOUS; SUBCUTANEOUS
Status: DISCONTINUED | OUTPATIENT
Start: 2017-03-14 | End: 2017-03-14

## 2017-03-14 RX ORDER — OXYCODONE AND ACETAMINOPHEN 5; 325 MG/1; MG/1
1-2 TABLET ORAL
Status: DISCONTINUED | OUTPATIENT
Start: 2017-03-14 | End: 2017-03-15 | Stop reason: HOSPADM

## 2017-03-14 RX ORDER — AMOXICILLIN 250 MG
1 CAPSULE ORAL
Status: DISCONTINUED | OUTPATIENT
Start: 2017-03-14 | End: 2017-03-15 | Stop reason: HOSPADM

## 2017-03-14 RX ORDER — IBUPROFEN 400 MG/1
800 TABLET ORAL
Status: DISCONTINUED | OUTPATIENT
Start: 2017-03-14 | End: 2017-03-15 | Stop reason: HOSPADM

## 2017-03-14 RX ORDER — FENTANYL CITRATE 50 UG/ML
100 INJECTION, SOLUTION INTRAMUSCULAR; INTRAVENOUS ONCE
Status: COMPLETED | OUTPATIENT
Start: 2017-03-14 | End: 2017-03-14

## 2017-03-14 RX ORDER — PROMETHAZINE HYDROCHLORIDE 25 MG/ML
25 INJECTION, SOLUTION INTRAMUSCULAR; INTRAVENOUS
Status: DISCONTINUED | OUTPATIENT
Start: 2017-03-14 | End: 2017-03-15 | Stop reason: HOSPADM

## 2017-03-14 RX ORDER — ROPIVACAINE HYDROCHLORIDE 2 MG/ML
9 INJECTION, SOLUTION EPIDURAL; INFILTRATION; PERINEURAL
Status: DISCONTINUED | OUTPATIENT
Start: 2017-03-14 | End: 2017-03-14

## 2017-03-14 RX ORDER — SODIUM CHLORIDE 0.9 % (FLUSH) 0.9 %
5-10 SYRINGE (ML) INJECTION EVERY 8 HOURS
Status: DISCONTINUED | OUTPATIENT
Start: 2017-03-14 | End: 2017-03-14

## 2017-03-14 RX ORDER — SODIUM CHLORIDE 0.9 % (FLUSH) 0.9 %
5-10 SYRINGE (ML) INJECTION AS NEEDED
Status: DISCONTINUED | OUTPATIENT
Start: 2017-03-14 | End: 2017-03-14

## 2017-03-14 RX ADMIN — Medication 2 MILLI-UNITS/MIN: at 06:07

## 2017-03-14 RX ADMIN — FENTANYL CITRATE 100 MCG: 50 INJECTION, SOLUTION INTRAMUSCULAR; INTRAVENOUS at 09:26

## 2017-03-14 RX ADMIN — SODIUM CHLORIDE, SODIUM LACTATE, POTASSIUM CHLORIDE, AND CALCIUM CHLORIDE 1000 ML: 600; 310; 30; 20 INJECTION, SOLUTION INTRAVENOUS at 08:50

## 2017-03-14 RX ADMIN — Medication 10 ML/HR: at 09:40

## 2017-03-14 RX ADMIN — SODIUM CHLORIDE, SODIUM LACTATE, POTASSIUM CHLORIDE, AND CALCIUM CHLORIDE 125 ML/HR: 600; 310; 30; 20 INJECTION, SOLUTION INTRAVENOUS at 01:34

## 2017-03-14 RX ADMIN — SODIUM CHLORIDE, SODIUM LACTATE, POTASSIUM CHLORIDE, AND CALCIUM CHLORIDE 125 ML/HR: 600; 310; 30; 20 INJECTION, SOLUTION INTRAVENOUS at 10:28

## 2017-03-14 RX ADMIN — IBUPROFEN 800 MG: 400 TABLET, FILM COATED ORAL at 19:50

## 2017-03-14 RX ADMIN — Medication 125 ML/HR: at 11:16

## 2017-03-14 RX ADMIN — CASTOR OIL 60 ML: 100 LIQUID ORAL at 10:40

## 2017-03-14 NOTE — PROGRESS NOTES
1038: Violeta Lecah CNM in room for delivery    1039: Nursery and Peds in room for delivery    1046:  of VFI with spontaneous respirations. Baby to maternal abdomen. Baby being tended to by nursery nurse. 1049: Cord clamped and cut.    1100: Spontaneous delivery of placenta. 1102: Perineum inspected by CNM. Perineum intact. 1103: Marium-care done, Ice pack applied, Baby remains skin to skin for magic hour, side rails up and call light in reach, FOB at bedside. Patient instructed not to get for the first time without nurse at bedside and to call with increased bleeding. 1110: Called dietary for lunch tray.

## 2017-03-14 NOTE — ROUTINE PROCESS
Bedside and Verbal shift change report given to Robbie Hayden RN (oncoming nurse) by Hieu Mendez RN (offgoing nurse). Report included the following information SBAR, Kardex, Intake/Output, MAR and Recent Results.

## 2017-03-14 NOTE — ROUTINE PROCESS
Patient c/o pain 6/10 with contractions, requests pain medications. Vaginal port of cervical ripening balloon deflated SVE 2/50/-2, vaginal port of cervical ripening balloon reinflated, Nubain 10mg IVP and Ambien 5mg PO given. Germaine Doe CNM is aware.

## 2017-03-14 NOTE — ROUTINE PROCESS
Bedside shift change report given to anabel rolon rn (oncoming nurse) by vianca canada rn (offgoing nurse). Report included the following information SBAR, Kardex and MAR.

## 2017-03-14 NOTE — ROUTINE PROCESS
[address] deflated and removed at this time.  SVE 4/50/-2.  0122 Patient out of bed and off monitor to shower and eat breakfast.

## 2017-03-14 NOTE — PROGRESS NOTES
OB Progress Note    S: Pt reports discomfort with Ctx. Desires epidural when ready. O: Patient Vitals for the past 4 hrs:   Temp Pulse Resp BP   17 0830 - 77 - 90/69   17 0815 - 69 - 105/70   17 0800 97.9 °F (36.6 °C) 76 17 115/71   17 0745 - 71 - (!) 86/49   17 0730 - 72 - (!) 87/52   17 0716 - 76 - 106/69   17 0700 - 72 - 104/70   17 0648 - 87 - 106/65   17 0630 - 75 - (!) 88/48   17 0615 - 86 - 102/68   17 0606 - 84 - 108/76            VE: 4-5/50/-2       TOCO: q 3 minutes       FHT: Catyegory 1       A/P: IUP at  [id number], Induction for SGA - progressing well . AROM clear fluid. Expect .                       Leif Jerry MD  2017

## 2017-03-14 NOTE — ANESTHESIA PREPROCEDURE EVALUATION
Anesthetic History   No history of anesthetic complications            Review of Systems / Medical History  Patient summary reviewed and pertinent labs reviewed    Pulmonary  Within defined limits                 Neuro/Psych   Within defined limits           Cardiovascular                  Exercise tolerance: <4 METS: 9 months pregnant     GI/Hepatic/Renal  Within defined limits              Endo/Other  Within defined limits           Other Findings              Physical Exam    Airway  Mallampati: II  TM Distance: 4 - 6 cm  Neck ROM: normal range of motion   Mouth opening: Normal     Cardiovascular    Rhythm: regular  Rate: normal         Dental  No notable dental hx       Pulmonary  Breath sounds clear to auscultation               Abdominal         Other Findings            Anesthetic Plan    ASA: 2, emergent  Anesthesia type: epidural            Anesthetic plan and risks discussed with: Patient

## 2017-03-14 NOTE — ROUTINE PROCESS
Verbal shift change report given to Jhony Red RN (oncoming nurse) by Kina Mcgill RN (offgoing nurse). Report included the following information SBAR, Kardex, Intake/Output, MAR and Recent Results.

## 2017-03-14 NOTE — ANESTHESIA POSTPROCEDURE EVALUATION
Post-Anesthesia Evaluation and Assessment    Patient: Mikey Cherry MRN: [id number]  SSN: [id number]    YOB: 1980  Age: 31 y.o. Sex: female       Cardiovascular Function/Vital Signs  Visit Vitals    /73 (BP 1 Location: Right arm, BP Patient Position: At rest)    Pulse 75    Temp 36.9 °C (98.4 °F)    Resp 16    Ht 5' 3\" (1.6 m)    Wt 69.4 kg (153 lb)    SpO2 100%    Breastfeeding Yes    BMI 27.1 kg/m2       Patient is status post epidural anesthesia for * No procedures listed *. Nausea/Vomiting: None    Postoperative hydration reviewed and adequate. Pain:  Pain Scale 1: Numeric (0 - 10) (03/14/17 1548)  Pain Intensity 1: 2 (03/14/17 1548)   Managed    Neurological Status:   Neuro (WDL): Within Defined Limits (03/14/17 1548)   At baseline    Mental Status and Level of Consciousness: Arousable    Pulmonary Status:   O2 Device: Room air (03/14/17 1548)   Adequate oxygenation and airway patent    Complications related to anesthesia: None    Post-anesthesia assessment completed.  No concerns    Signed By: Ольга Fisher MD     March 14, 2017

## 2017-03-14 NOTE — ANESTHESIA PROCEDURE NOTES
Epidural Block    Start time: 3/14/2017 9:20 AM  End time: 3/14/2017 9:40 AM  Performed by: Cinthia Almanzar  Authorized by: Cinthia Almanzar     Pre-Procedure  Indication: at surgeon's request and labor epidural    Preanesthetic Checklist: patient identified, risks and benefits discussed, anesthesia consent, site marked, patient being monitored, timeout performed and anesthesia consent    Timeout Time: 09:20        Epidural:   Patient position:  Seated  Prep region:  Lumbar  Location:  L3-4    Needle and Epidural Catheter:   Needle Type:  Tuohy  Needle Gauge:  18 G  Injection Technique:  Loss of resistance using air  Attempts:  1  Catheter Size:  20 G  Catheter at Skin Depth (cm):  10  Depth in Epidural Space (cm):  5.5  Events: no blood with aspiration, no cerebrospinal fluid with aspiration, no paresthesia and negative aspiration test    Test Dose:  Lidocaine 1.5% w/ epi and negative    Assessment:   Catheter Secured:  Tegaderm  Insertion:  Uncomplicated  Patient tolerance:  Patient tolerated the procedure well with no immediate complications  Labor Epidural Note    Patient: Lila Mosqueda MRN: [id number]  SSN: [id number]   YOB: 1980  Age: 31 y.o. Sex: female      Cardiovascular Function/Vital Signs  There were no vitals taken for this visit. Referring physician:    Epidural Catheter Placement  : Kylah Etienne MD    G 2P1 in labor:  5   cm dilated. Risks, benefits, and alternatives discussed. Consent obtained. Time out performed, Correct patient and site identified. Patient placed in sitting position, back prepped and draped with Chlorhexidine x3. Local-1% lidocaine to skin and subcutaneous. 18 Tuohy needle placed at  Level L3 - L4 to epidural space by + POLO obtained with [email or web address] cm, no heme or CSF noted. Epidural catheter placed via Tuohy needle and secured at 10 cm at skin, no paresthesias,heme or CSF noted.   Negative test dose with 3 mLs 1.5% lidocaine w/1:200,000 epinephrine. Fentanyl 100 mcg via epidural catheter. Ropivacaine 0.2 % bolus of 9 mL given in 3 mL increments over 10 minutes. Patient tolerated the procedure well, VSS throughout, no apparent complications. Empty flowsheet group. For additional vitals, please see nurses notes.      Epidural kit: Lot# [id number]               Expiration [id number]  Anesthesia Start time: 09:20    3/14/2017, 9:47 AM  Renay Valerio MD

## 2017-03-14 NOTE — L&D DELIVERY NOTE
Delivery Summary    Robert Meza had increased rectal pressure and FHR to 115. SVE 10- cms/ +2. Pushed well with encouragement to crown.  of VFI over intact perineum. Baby to maternal abdomen with spont cry, pink color. apgars 8/9. Skin to skin , cord clamped after placental transfusion complete. By dad. Placenta delivered spont intact with 3 CV. EBL 300cc. Perineum intact. Yoli    Patient: Nena Doyle MRN: [id number]  SSN: [id number]    YOB: 1980  Age: 31 y.o. Sex: female        Labor Events:    Labor: No    Rupture Date: 3/14/2017    Rupture Time: 8:40 AM    Rupture Type AROM    Amniotic Fluid Volume: Moderate     Amniotic Fluid Description: Clear       Induction: Prostaglandins         Augmentation: Oxytocin    Labor Complications: Additional Complications:        Cervical Ripening:       Cervidil      Delivery Events:  Episiotomy: None    Laceration(s): None       Repaired: None     Number of Repair Packets:      Suture Type and Size: None        Estimated Blood Loss (ml):          Information for the patient's :  [place] Vinicio [[id number]]     Delivery Summary - Baby    Delivery Date: 3/14/2017   Delivery Time: 10:46 AM   Delivery Type: Vaginal, Spontaneous Delivery  Sex:  female  Gestational Age: 38w11d  Delivery Clinician:  Brijesh Novoa  Living?: Yes   Delivery Location: L&D 3420           APGARS  One minute Five minutes Ten minutes   Skin Color: 0    1       Heart Rate: 2   2         Reflex Irritability: 2   2         Muscle Tone: 2   2       Respiration: 2   2         Total: 8   9           Presentation: Vertex  Position: Right Occiput Anterior  Resuscitation Method:  Suctioning-bulb; Tactile Stimulation     Meconium Stained: None    Cord Information: 3 Vessels   Complications: Nuchal Cord Without Compressions  Cord Blood Sent?:  Yes    Blood Gases Sent?:  No    Placenta:  Date/Time: 3/14 11:00 AM  Removal: Spontaneous      Appearance: Normal     [place] Measurements:  Birth Weight:      Birth Length:     Head Circumference:       Chest Circumference:      Abdominal Girth: Other Providers:   Jose ESPARZA;ARIADNA DENNISON;JOSSE MITCHELL;;FELICITA ROUSE Midwife;Primary Nurse;Primary [place] Nurse;Pediatrician; Anesthesiologist           Cord Blood Results:  Information for the patient's :  Charity Starrgilberto [[id number]]   No results found for: Aniket Hyatt, PCTDIG, BILI, ABORHEXT, [address]    Information for the patient's :  Charity Block [[id number]]   No results found for: APH, APCO2, APO2, AHCO3, ABEC, ABDC, O2ST, SITE, RSCOM, PHI, PCO2I, PO2I, HCO3I, SO2I, IBD     Information for the patient's :  Charity Mattygilberto [[id number]]   No results found for: EPHV, PCO2V, PO2V, HCO3V, O2STV, EBDV

## 2017-03-15 VITALS
BODY MASS INDEX: 27.11 KG/M2 | DIASTOLIC BLOOD PRESSURE: 78 MMHG | WEIGHT: 153 LBS | RESPIRATION RATE: 17 BRPM | TEMPERATURE: 97.9 F | HEART RATE: 65 BPM | HEIGHT: 63 IN | OXYGEN SATURATION: 98 % | SYSTOLIC BLOOD PRESSURE: 119 MMHG

## 2017-03-15 LAB
HCT VFR BLD AUTO: 37.2 % (ref 35–45)
HGB BLD-MCNC: 12.4 G/DL (ref 12–16)

## 2017-03-15 PROCEDURE — 74011250637 HC RX REV CODE- 250/637: Performed by: ADVANCED PRACTICE MIDWIFE

## 2017-03-15 PROCEDURE — 36415 COLL VENOUS BLD VENIPUNCTURE: CPT | Performed by: ADVANCED PRACTICE MIDWIFE

## 2017-03-15 PROCEDURE — 85018 HEMOGLOBIN: CPT | Performed by: ADVANCED PRACTICE MIDWIFE

## 2017-03-15 PROCEDURE — 85014 HEMATOCRIT: CPT | Performed by: ADVANCED PRACTICE MIDWIFE

## 2017-03-15 RX ORDER — IBUPROFEN 800 MG/1
800 TABLET ORAL
Qty: 60 TAB | Refills: 2 | Status: SHIPPED | OUTPATIENT
Start: 2017-03-15

## 2017-03-15 RX ADMIN — OXYCODONE HYDROCHLORIDE AND ACETAMINOPHEN 1 TABLET: 5; 325 TABLET ORAL at 04:09

## 2017-03-15 RX ADMIN — OXYCODONE HYDROCHLORIDE AND ACETAMINOPHEN 1 TABLET: 5; 325 TABLET ORAL at 08:02

## 2017-03-15 RX ADMIN — IBUPROFEN 800 MG: 400 TABLET, FILM COATED ORAL at 04:09

## 2017-03-15 NOTE — DISCHARGE INSTRUCTIONS

## 2017-03-15 NOTE — ROUTINE PROCESS
Bedside shift change report given to vianca Damon (oncoming nurse) by a bony RN(offgoing nurse). Report included the following information Kardex, Procedure Summary, Intake/Output, MAR and Recent Results.

## 2017-03-15 NOTE — DISCHARGE SUMMARY
[address]  [address] [place] [address]  [phone]       Obstetrical Discharge Summary     Patient ID:  Barry Childs  [id number]  11 y.o.  1980    Admit Date: 3/12/2017    Discharge Date: 3/15/2017     Admitting Physician: Woody Sow MD     Admission Diagnoses: maternity  Labor and delivery indication for care or intervention    Final Diagnosis: [email or web address] Delivered    Additional Diagnoses:Present on Admission:   Small for gestational age fetus affecting mother, antepartum         OB History      Para Term  AB TAB SAB Ectopic Multiple Living    2 2 2      0 1        Lab Results   Component Value Date/Time    Rubella, External IMM 2016    GrBStrep, External NEG 2017       Baby link  Information for the patient's :  Neema Contreras [[id number]]     Delivery Type: Vaginal, Spontaneous Delivery   Delivery Date: 3/14/2017   Delivery Time: 10:46 AM     Birth Weight: 2.99 kg     Sex:  female  Delivery Clinician:  Zia Vasquez   Gestational Age: Gestational Age: 38w11d    Presentation: Vertex   Position: Right  Occiput  Anterior     Apgars were 8  and 9      Resuscitation Method: Suctioning-bulb; Tactile Stimulation     Meconium Stained: None  Living Status: Yes       Placenta Date/Time: 3/14 11:00 AM   Placenta Removal: Spontaneous   Placenta Appearance: Vertex [1]     Cord Information: 3 Vessels    Cord Events: Nuchal Cord Without Compressions       Cord Blood Sent?:  Yes    Blood Gases Sent?:  No      Infant Information: Information for the patient's :  Neema Contreras [[id number]]   One Minute Apgar: 8 (Filed from Delivery Summary)  Five  Minute Apgar: 9 (Filed from Delivery Summary)     Baby Procedures:    Information for the patient's :  Neema Contreras [[id number]]        Diet:  Regular  Feeding Method: Infant Feeding: Breastmilk    Vitals:  Patient Vitals for the past 8 hrs:   BP Temp Pulse Resp SpO2   03/15/17 0216 105/71 97.8 °F (36.6 °C) 76 16 97 %     Temp (24hrs), Av.1 °F (36.7 °C), Min:97.8 °F (36.6 °C), Max:98.4 °F (36.9 °C)      Vital signs stable, afebrile. Exam:  Patient is in good general condition. Emotionally: appears to be stable  Fundus:  Firm and not tender. Lower extremities are negative for swelling, cords or tenderness. Lab/Data Review:  CBC: Lab Results   Component Value Date/Time    WBC 10.8 2017 05:45 PM    RBC 4.12 2017 05:45 PM    HGB 12.4 03/15/2017 06:20 AM    HCT 37.2 03/15/2017 06:20 AM    PLATELET 203 8095 05:45 PM     Lab results reviewed. For significant abnormal values and values requiring intervention, see assessment and plan. Activity: as tolerated    Discharge Instructions: Nothing in vagina, no heavy lifting or exercise for 6 weeks. No driving for 1 week or while taking narcotics. SITZ bath for perineal discomfort. F/U 6 weeks post partum check. Call for heavy bleeding, temperature over 101 degrees, heavy vaginal bleeding, foul vaginal odor or worsening abdominal pain. Medications:   Current Discharge Medication List      START taking these medications    Details   ibuprofen (MOTRIN) 800 mg tablet Take 1 Tab by mouth every eight (8) hours as needed. Qty: 60 Tab, Refills: 2         CONTINUE these medications which have NOT CHANGED    Details   XIK46/SMLX FUM/FOLIC ACID (PRENATAL PO) Take 1 Tab by mouth daily. STOP taking these medications       valACYclovir (VALTREX) 500 mg tablet Comments:   Reason for Stopping:                 Condition at discharge: Stable and doing well.   Disposition: Home    March 15, 2017  Chris Briseno CNM

## 2017-04-28 ENCOUNTER — HOSPITAL ENCOUNTER (OUTPATIENT)
Dept: LAB | Age: 37
Discharge: HOME | End: 2017-04-28
Payer: OTHER GOVERNMENT

## 2017-04-28 PROCEDURE — 88175 CYTOPATH C/V AUTO FLUID REDO: CPT | Performed by: ADVANCED PRACTICE MIDWIFE

## 2017-04-28 PROCEDURE — 87624 HPV HI-RISK TYP POOLED RSLT: CPT | Performed by: ADVANCED PRACTICE MIDWIFE

## 2024-01-01 NOTE — LACTATION NOTE
Mother breast fed her first baby. Mother states this baby is latching and nursing well. Experienced mother. General discussion. Gave BF information and daily log. Encouraged to call if needed. Mother asked for assistance. She was nursing baby. Good position, good latch. Nipples do not look damaged but have the initial soreness. Gave cream. Also discussed pump options. Offered assistance if needed before discharge. (2) cough or sneeze